# Patient Record
(demographics unavailable — no encounter records)

---

## 2024-10-29 NOTE — REASON FOR VISIT
[Symptom and Test Evaluation] : symptom and test evaluation [Arrhythmia/ECG Abnorrmalities] : arrhythmia/ECG abnormalities [Structural Heart and Valve Disease] : structural heart and valve disease [Hyperlipidemia] : hyperlipidemia

## 2024-10-31 NOTE — REVIEW OF SYSTEMS
[Anxiety] : anxiety [Fever] : no fever [Blurry Vision] : no blurred vision [Earache] : no earache [Sore Throat] : no sore throat [SOB] : no shortness of breath [Dyspnea on exertion] : not dyspnea during exertion [Chest Discomfort] : no chest discomfort [Cough] : no cough [Abdominal Pain] : no abdominal pain [Nausea] : no nausea [Vomiting] : no vomiting [Change in Appetite] : no change in appetite [Constipation] : no constipation [Dysuria] : no dysuria [Joint Pain] : no joint pain [Rash] : no rash [Numbness (Hypoesthesia)] : no numbness [Weakness] : no weakness [Speech Disturbance] : no speech disturbance [Memory Lapses Or Loss] : no memory lapses or loss [Depression] : no depression

## 2024-10-31 NOTE — CARDIOLOGY SUMMARY
[de-identified] : 10/31/24 sinus rhythm left axis LAFB LVH   no significant change.  [de-identified] : 9/29/23 predominantly sinus rhythm  ,rare PACS 22 runs of PSVT afib lasted for 3 hours 24 seconds between 72 to 225  total 3% of burden  rare PVCS  9/15/24 sinus rhythm  4 runs of PSVT longest 6 beats ,   rare PVCS , symptoms appears to be associated with  PVCS  [de-identified] : 12/6/21  5 METS  91%MPHR   rare VPCS during stress  normal nuclear stress study  normal EF  [de-identified] : 12/10/21  EF 65-70%  aortic sclerosis , Mitral valve sclerosis  Trace MR  8/2/24  Normal EF mild DD  MAC , aortic sclerosis , Mild TR normal RVSP  [de-identified] : 12/10/21  mild atherosclerosis no stenosis

## 2024-10-31 NOTE — CARDIOLOGY SUMMARY
[de-identified] : 10/31/24 sinus rhythm left axis LAFB LVH   no significant change.  [de-identified] : 9/29/23 predominantly sinus rhythm  ,rare PACS 22 runs of PSVT afib lasted for 3 hours 24 seconds between 72 to 225  total 3% of burden  rare PVCS  9/15/24 sinus rhythm  4 runs of PSVT longest 6 beats ,   rare PVCS , symptoms appears to be associated with  PVCS  [de-identified] : 12/6/21  5 METS  91%MPHR   rare VPCS during stress  normal nuclear stress study  normal EF  [de-identified] : 12/10/21  EF 65-70%  aortic sclerosis , Mitral valve sclerosis  Trace MR  8/2/24  Normal EF mild DD  MAC , aortic sclerosis , Mild TR normal RVSP  [de-identified] : 12/10/21  mild atherosclerosis no stenosis

## 2024-10-31 NOTE — DISCUSSION/SUMMARY
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  will continue atenolol ,   Hx prior atrial fibrillation episode in setting acute  GI illness with dehydration ::palpitations  with recurrent atrial fibrillation , with controlled rate , PAF   currently in sinus rhythm  had normal ventricular systolic function , normal LA size ,  continue atenolol ,  eliquis 5 mg po BID ,   Hyperlipidemia :  non adherent to medication , continue simvastatin to 80 mg po daily  without missing , repeat blood work if still elevated would add zetia 10 mg po daily  follow up blood work   abnormal MRI of brain : report reviewed  showed chronic microvascular white matter disease , no obvious evidence of stroke ( infarct on MRI )  normal blood pressure , would recommend to maintain LDL level < 70 , will titrate the medication as needed ,   tingling sensations in both feet : based on description appears to be peripheral neuropathy  recommended to follow up with neurologist for further work up   anxiety :  continue zoloft  , PRN alprazolam  psych follow up      [EKG obtained to assist in diagnosis and management of assessed problem(s)] : EKG obtained to assist in diagnosis and management of assessed problem(s)

## 2024-10-31 NOTE — HISTORY OF PRESENT ILLNESS
[FreeTextEntry1] : This is a 71  year old woman with a history of anxiety , PAF ,neuropathy , hyperlipidemia came for follow up says  she is feeling ok , less palpitations , but her right knee pain bothering her a lot , recommended to have knee replacement  . Patient denies any chest pain or shortness of breath or dizziness , occasional palpitation .   patient is very anxious ,  her ekg showed sinus bradycardia , blood pressure is normal range ,  Patient  had normal nuclear stress test in December 2021 ,  had echo showed normal ventricular function , aortic sclerosis .  normal  BP   Patient was having herniated disc ,  tingling sensation  both feet and lower part of legs , more in the supine position , not related to activity ,  patient was evaluated  by neurologist ,   Patient s lipid s   , ,  Oct 2024  Patient says she is not adherent to 80 mg po simvastatin ,   her blood pressure is optimal   She has been having jaw tingling and pain.  She is being worked up for trigeminal neuralgia, but was asked to exclude a cardiac etiology.  the MRI report reviewed, showed microvascular  disease , no specific infarct noted , She denies an exertional component.  No other cardiac symptoms.

## 2025-01-05 NOTE — DISCUSSION/SUMMARY
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  will continue atenolol ,   Hx prior atrial fibrillation episode in setting acute  GI illness with dehydration ::palpitations  with recurrent atrial fibrillation , with controlled rate , PAF   currently in sinus rhythm  had normal ventricular systolic function , normal LA size ,  continue atenolol ,  eliquis 5 mg po BID ,   Hyperlipidemia :  non adherent to medication , continue simvastatin to 80 mg po daily  without missing , repeat blood work if still elevated would add zetia 10 mg po daily  follow up blood work   abnormal MRI of brain : report reviewed  showed chronic microvascular white matter disease , no obvious evidence of stroke ( infarct on MRI )  normal blood pressure , would recommend to maintain LDL level < 70 , will titrate the medication as needed ,   tingling sensations in both feet : based on description appears to be peripheral neuropathy  recommended to follow up with neurologist for further work up   anxiety :  continue zoloft  , PRN alprazolam  psych follow up

## 2025-01-05 NOTE — CARDIOLOGY SUMMARY
[de-identified] : 10/31/24 sinus rhythm left axis LAFB LVH   no significant change.  [de-identified] : 9/29/23 predominantly sinus rhythm  ,rare PACS 22 runs of PSVT afib lasted for 3 hours 24 seconds between 72 to 225  total 3% of burden  rare PVCS  9/15/24 sinus rhythm  4 runs of PSVT longest 6 beats ,   rare PVCS , symptoms appears to be associated with  PVCS  [de-identified] : 12/6/21  5 METS  91%MPHR   rare VPCS during stress  normal nuclear stress study  normal EF  [de-identified] : 12/10/21  EF 65-70%  aortic sclerosis , Mitral valve sclerosis  Trace MR  8/2/24  Normal EF mild DD  MAC , aortic sclerosis , Mild TR normal RVSP  [de-identified] : 12/10/21  mild atherosclerosis no stenosis

## 2025-01-05 NOTE — REVIEW OF SYSTEMS
[Fever] : no fever [Blurry Vision] : no blurred vision [Earache] : no earache [Sore Throat] : no sore throat [SOB] : no shortness of breath [Dyspnea on exertion] : not dyspnea during exertion [Chest Discomfort] : no chest discomfort [Cough] : no cough [Abdominal Pain] : no abdominal pain [Nausea] : no nausea [Vomiting] : no vomiting [Change in Appetite] : no change in appetite [Constipation] : no constipation [Dysuria] : no dysuria [Joint Pain] : no joint pain [Rash] : no rash [Numbness (Hypoesthesia)] : no numbness [Weakness] : no weakness [Speech Disturbance] : no speech disturbance [Memory Lapses Or Loss] : no memory lapses or loss [Depression] : no depression [Anxiety] : anxiety

## 2025-01-05 NOTE — PHYSICAL EXAM
[Well Developed] : well developed [Well Nourished] : well nourished [No Acute Distress] : no acute distress [Normal Conjunctiva] : normal conjunctiva [Normal Venous Pressure] : normal venous pressure [No Carotid Bruit] : no carotid bruit [Normal S1, S2] : normal S1, S2 [No Murmur] : no murmur [No Rub] : no rub [No Gallop] : no gallop [Clear Lung Fields] : clear lung fields [Good Air Entry] : good air entry [No Respiratory Distress] : no respiratory distress  [Non Tender] : non-tender [Soft] : abdomen soft [Normal Bowel Sounds] : normal bowel sounds [Normal Gait] : normal gait [No Edema] : no edema [No Cyanosis] : no cyanosis [No Clubbing] : no clubbing [No Varicosities] : no varicosities [No Rash] : no rash [No Skin Lesions] : no skin lesions [Moves all extremities] : moves all extremities [No Focal Deficits] : no focal deficits [Alert and Oriented] : alert and oriented [Normal Speech] : normal speech [Normal memory] : normal memory

## 2025-01-12 NOTE — CARDIOLOGY SUMMARY
[de-identified] : 10/31/24 sinus rhythm left axis LAFB LVH   no significant change.  [de-identified] : 9/29/23 predominantly sinus rhythm  ,rare PACS 22 runs of PSVT afib lasted for 3 hours 24 seconds between 72 to 225  total 3% of burden  rare PVCS  9/15/24 sinus rhythm  4 runs of PSVT longest 6 beats ,   rare PVCS , symptoms appears to be associated with  PVCS  [de-identified] : 12/6/21  5 METS  91%MPHR   rare VPCS during stress  normal nuclear stress study  normal EF  [de-identified] : 12/10/21  EF 65-70%  aortic sclerosis , Mitral valve sclerosis  Trace MR  8/2/24  Normal EF mild DD  MAC , aortic sclerosis , Mild TR normal RVSP  [de-identified] : 12/10/21  mild atherosclerosis no stenosis

## 2025-01-12 NOTE — REVIEW OF SYSTEMS
[Fever] : no fever [Blurry Vision] : no blurred vision [Earache] : no earache [Sore Throat] : no sore throat [SOB] : no shortness of breath [Dyspnea on exertion] : not dyspnea during exertion [Chest Discomfort] : no chest discomfort [Cough] : no cough [Abdominal Pain] : no abdominal pain [Nausea] : no nausea [Vomiting] : no vomiting [Change in Appetite] : no change in appetite [Constipation] : no constipation [Dysuria] : no dysuria [Joint Pain] : no joint pain [Rash] : no rash [Numbness (Hypoesthesia)] : no numbness [Weakness] : no weakness [Speech Disturbance] : no speech disturbance [Memory Lapses Or Loss] : no memory lapses or loss [Depression] : no depression

## 2025-01-17 NOTE — CARDIOLOGY SUMMARY
[de-identified] : 10/31/24 sinus rhythm left axis LAFB LVH   no significant change.  [de-identified] : 9/29/23 predominantly sinus rhythm  ,rare PACS 22 runs of PSVT afib lasted for 3 hours 24 seconds between 72 to 225  total 3% of burden  rare PVCS  9/15/24 sinus rhythm  4 runs of PSVT longest 6 beats ,   rare PVCS , symptoms appears to be associated with  PVCS  [de-identified] : 12/6/21  5 METS  91%MPHR   rare VPCS during stress  normal nuclear stress study  normal EF  [de-identified] : 12/10/21  EF 65-70%  aortic sclerosis , Mitral valve sclerosis  Trace MR  8/2/24  Normal EF mild DD  MAC , aortic sclerosis , Mild TR normal RVSP  [de-identified] : 12/10/21  mild atherosclerosis no stenosis

## 2025-01-21 NOTE — CARDIOLOGY SUMMARY
[de-identified] : 10/31/24 sinus rhythm left axis LAFB LVH   no significant change.  [de-identified] : 9/29/23 predominantly sinus rhythm  ,rare PACS 22 runs of PSVT afib lasted for 3 hours 24 seconds between 72 to 225  total 3% of burden  rare PVCS  9/15/24 sinus rhythm  4 runs of PSVT longest 6 beats ,   rare PVCS , symptoms appears to be associated with  PVCS  [de-identified] : 12/6/21  5 METS  91%MPHR   rare VPCS during stress  normal nuclear stress study  normal EF  [de-identified] : 12/10/21  EF 65-70%  aortic sclerosis , Mitral valve sclerosis  Trace MR  8/2/24  Normal EF mild DD  MAC , aortic sclerosis , Mild TR normal RVSP  [de-identified] : 12/10/21  mild atherosclerosis no stenosis

## 2025-02-07 NOTE — IMAGING
[Bilateral] : knee bilaterally [All Views] : anteroposterior, lateral, skyline, and anteroposterior standing [Advanced patellofemoral OA] : Advanced patellofemoral OA

## 2025-02-07 NOTE — ASSESSMENT
[FreeTextEntry1] : 72 year F WITH MODERATE B/L KNEE PAIN, R>L. PAIN WORSENS WITH STAIRS AND WALKING PROLONGED DISTANCES. PAIN IS AFFECTING ADL AND FUNCTIONAL ACTIVITIES. XRAYS REVIEWED WITH B/L ADVANCED PATELLOFEMORAL OA. TREATMENT OPTIONS REVIEWED. QUESTIONS ANSWERED.   DISCUSSED TAKING ANTI-INFLAMMATORIES AND MEDICATION MANAGEMENT. BECAUSE THE PATIENT IS ON A BLOOD THINNER, I RECOMMENDED NOT TAKING ANTI-INFLAMMATORIES BUT WILL CONTINUE WITH TYLENOL AS NEEDED FOR PAIN.

## 2025-02-07 NOTE — PHYSICAL EXAM
[Bilateral] : knee bilaterally [] : patient ambulates without assistive device [TWNoteComboBox7] : flexion 130 degrees [de-identified] : extension 0 degrees

## 2025-02-07 NOTE — DISCUSSION/SUMMARY
[de-identified] : I, Mariah Fajardo, am scribing for Dr. Sanders in his presence for the chief complaint, physical exam, studies, assessment, and/or plan.

## 2025-02-27 NOTE — DISCUSSION/SUMMARY
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  will continue atenolol ,   Hx prior atrial fibrillation episode in setting acute  GI illness with dehydration ::palpitations  with recurrent atrial fibrillation , with controlled rate , PAF   currently in sinus rhythm  had normal ventricular systolic function , normal LA size ,  continue atenolol ,  eliquis 5 mg po BID ,   Hyperlipidemia :  non adherent to medication , continue simvastatin to 80 mg po daily  without missing , repeat blood work if still elevated would add zetia 10 mg po daily  follow up blood work   abnormal MRI of brain : report reviewed  showed chronic microvascular white matter disease , no obvious evidence of stroke ( infarct on MRI )  normal blood pressure , would recommend to maintain LDL level < 70 , will titrate the medication as needed ,   tingling sensations in both feet : based on description appears to be peripheral neuropathy  recommended to follow up with neurologist for further work up   anxiety :  continue zoloft  , PRN alprazolam  psych follow up

## 2025-02-27 NOTE — HISTORY OF PRESENT ILLNESS
[FreeTextEntry1] : This is a 71  year old woman with a history of anxiety , PAF ,neuropathy , hyperlipidemia came for follow up says  she is feeling terrible as she lost her PET recently very distrubed with it mentally , but , less palpitations than before , but her right knee pain bothering her a lot , recommended to have knee replacement  . Patient denies any chest pain or shortness of breath or dizziness , occasional palpitation .   patient is very anxious ,  her ekg showed sinus bradycardia , blood pressure is normal range ,  Patient  had normal nuclear stress test in December 2021 ,  had echo showed normal ventricular function , aortic sclerosis .  normal  BP   Patient was having herniated disc ,  tingling sensation  both feet and lower part of legs , more in the supine position , not related to activity ,  patient was evaluated  by neurologist ,   Patient s lipid s   , ,  Oct 2024  Patient says she is not adherent to 80 mg po simvastatin ,   her blood pressure is optimal   She has been having jaw tingling and pain.  She is being worked up for trigeminal neuralgia, but was asked to exclude a cardiac etiology.  the MRI report reviewed, showed microvascular  disease , no specific infarct noted , She denies an exertional component.  No other cardiac symptoms.

## 2025-02-27 NOTE — CARDIOLOGY SUMMARY
[de-identified] : 2/27/25 sinus rhythm left axis LAFB LVH   no significant change.  [de-identified] : 9/29/23 predominantly sinus rhythm  ,rare PACS 22 runs of PSVT afib lasted for 3 hours 24 seconds between 72 to 225  total 3% of burden  rare PVCS  9/15/24 sinus rhythm  4 runs of PSVT longest 6 beats ,   rare PVCS , symptoms appears to be associated with  PVCS  [de-identified] : 12/6/21  5 METS  91%MPHR   rare VPCS during stress  normal nuclear stress study  normal EF  [de-identified] : 12/10/21  EF 65-70%  aortic sclerosis , Mitral valve sclerosis  Trace MR  8/2/24  Normal EF mild DD  MAC , aortic sclerosis , Mild TR normal RVSP  [de-identified] : 12/10/21  mild atherosclerosis no stenosis

## 2025-02-27 NOTE — CARDIOLOGY SUMMARY
[de-identified] : 2/27/25 sinus rhythm left axis LAFB LVH   no significant change.  [de-identified] : 9/29/23 predominantly sinus rhythm  ,rare PACS 22 runs of PSVT afib lasted for 3 hours 24 seconds between 72 to 225  total 3% of burden  rare PVCS  9/15/24 sinus rhythm  4 runs of PSVT longest 6 beats ,   rare PVCS , symptoms appears to be associated with  PVCS  [de-identified] : 12/6/21  5 METS  91%MPHR   rare VPCS during stress  normal nuclear stress study  normal EF  [de-identified] : 12/10/21  EF 65-70%  aortic sclerosis , Mitral valve sclerosis  Trace MR  8/2/24  Normal EF mild DD  MAC , aortic sclerosis , Mild TR normal RVSP  [de-identified] : 12/10/21  mild atherosclerosis no stenosis

## 2025-02-27 NOTE — CARDIOLOGY SUMMARY
[de-identified] : 2/27/25 sinus rhythm left axis LAFB LVH   no significant change.  [de-identified] : 9/29/23 predominantly sinus rhythm  ,rare PACS 22 runs of PSVT afib lasted for 3 hours 24 seconds between 72 to 225  total 3% of burden  rare PVCS  9/15/24 sinus rhythm  4 runs of PSVT longest 6 beats ,   rare PVCS , symptoms appears to be associated with  PVCS  [de-identified] : 12/6/21  5 METS  91%MPHR   rare VPCS during stress  normal nuclear stress study  normal EF  [de-identified] : 12/10/21  EF 65-70%  aortic sclerosis , Mitral valve sclerosis  Trace MR  8/2/24  Normal EF mild DD  MAC , aortic sclerosis , Mild TR normal RVSP  [de-identified] : 12/10/21  mild atherosclerosis no stenosis

## 2025-03-11 NOTE — HISTORY OF PRESENT ILLNESS
[de-identified] : Dang Del Real is a 73 year old female with history of afib on Eliquis presents for evaluation of sore throat. She states she has had sore throat for the past month. She was treated with zpak for pharyngitis at urgent care with negative strep test a month ago. She did not have any relief with zpak. She saw her PCP 4 days ago and was given clotrimazole for thrush. She states she has burning sensation in mouth. She states she has odynophagia. She states intermittent hoarseness of voice. She is unsure if she has heartburn symptoms. She denies dysphagia, dyspnea, choking, regurgitation. She denies recent fever or recurrent tonsillitis.

## 2025-03-11 NOTE — PHYSICAL EXAM
[Midline] : trachea located in midline position [Laryngoscopy Performed] : laryngoscopy was performed, see procedure section for findings [Normal] :  tongue is normal

## 2025-03-11 NOTE — ASSESSMENT
[FreeTextEntry1] : Dang Del Real presents for evaluation of chronic sore throat. She is on troches for thrush as per pcp. On exam, there is no evidence of thrush. Flexible laryngoscopy was performed showing evidence of laryngopharyngeal reflux. We discussed antireflux precautions and handout was provided.  - f/u in 3 mo

## 2025-04-11 NOTE — PHYSICAL EXAM
[Bilateral] : knee bilaterally [] : no erythema [TWNoteComboBox7] : flexion 130 degrees [de-identified] : extension 0 degrees

## 2025-04-11 NOTE — ASSESSMENT
[FreeTextEntry1] : 72 year F WITH MODERATE B/L KNEE PAIN, R>L. PAIN WORSENS WITH STAIRS AND WALKING PROLONGED DISTANCES. PAIN IS AFFECTING ADL AND FUNCTIONAL ACTIVITIES. XRAYS REVIEWED WITH B/L ADVANCED PATELLOFEMORAL OA. TREATMENT OPTIONS REVIEWED. QUESTIONS ANSWERED. PT RX.  DISCUSSED TAKING ANTI-INFLAMMATORIES AND MEDICATION MANAGEMENT. BECAUSE THE PATIENT IS ON A BLOOD THINNER, I RECOMMENDED NOT TAKING ANTI-INFLAMMATORIES BUT WILL CONTINUE WITH TYLENOL AS NEEDED FOR PAIN. LIDOCAINE PATCHES PRESCRIBED.  Therapy

## 2025-04-11 NOTE — HISTORY OF PRESENT ILLNESS
[Intermittent] : intermittent [Rest] : rest [Walking] : walking [5] : 5 [de-identified] : 02/07/2025: Dang is a 72-year-old female presenting with right knee pain. Patient states she fell in May while walking on the concrete.   4/11/25: Patient presents today for a follow up visit for the right knee. Patient states both hips bother her.  [FreeTextEntry1] : Right knee [FreeTextEntry9] : elevation

## 2025-05-02 NOTE — ASSESSMENT
[FreeTextEntry1] : Dang Del Real presents for follow-up. She implemented antireflux precautions and notes resolution of sore throat. She is pleased with outcome.  - f/u prn

## 2025-05-02 NOTE — HISTORY OF PRESENT ILLNESS
[de-identified] :  Dang Del Real is a 73 year old female with history of afib on Eliquis presents for evaluation of sore throat. She states she has had sore throat for the past month. She was treated with zpak for pharyngitis at urgent care with negative strep test a month ago. She did not have any relief with zpak. She saw her PCP 4 days ago and was given clotrimazole for thrush. She states she has burning sensation in mouth. She states she has odynophagia. She states intermittent hoarseness of voice. She is unsure if she has heartburn symptoms. She denies dysphagia, dyspnea, choking, regurgitation. She denies recent fever or recurrent tonsillitis.  [FreeTextEntry1] : 5/2/25 - Dangbright Del Real presents for follow up. She states her sore throat has improved after implementing antireflux precautions. She denies dysphagia, dysphonia, globus sensation, aspiration, dyspnea. She denies heartburn symptoms. She denies fever.

## 2025-06-19 NOTE — CARDIOLOGY SUMMARY
[de-identified] : 2/27/25 sinus rhythm left axis LAFB LVH   no significant change.  [de-identified] : 9/29/23 predominantly sinus rhythm  ,rare PACS 22 runs of PSVT afib lasted for 3 hours 24 seconds between 72 to 225  total 3% of burden  rare PVCS  9/15/24 sinus rhythm  4 runs of PSVT longest 6 beats ,   rare PVCS , symptoms appears to be associated with  PVCS  [de-identified] : 12/6/21  5 METS  91%MPHR   rare VPCS during stress  normal nuclear stress study  normal EF  6/19/25   normal chemical nuclear stress test  [de-identified] : 12/10/21  EF 65-70%  aortic sclerosis , Mitral valve sclerosis  Trace MR  8/2/24  Normal EF mild DD  MAC , aortic sclerosis , Mild TR normal RVSP  [de-identified] : 12/10/21  mild atherosclerosis no stenosis

## 2025-06-19 NOTE — DISCUSSION/SUMMARY
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  will continue atenolol  25 mg po daily  ,   Hx prior atrial fibrillation episode in setting acute  GI illness with dehydration ::palpitations  with recurrent atrial fibrillation , with controlled rate , PAF   currently in sinus rhythm  had normal ventricular systolic function , normal LA size ,  continue atenolol ,  eliquis 5 mg po BID ,   Hyperlipidemia:   improving non adherent to medication , continue simvastatin to 80 mg po daily   zetia 10 mg po daily  follow up blood work   abnormal MRI of brain : report reviewed  showed chronic microvascular white matter disease , no obvious evidence of stroke ( infarct on MRI )  normal blood pressure , would recommend to maintain LDL level < 70 , will titrate the medication as needed ,   tingling sensations in both feet : based on description appears to be peripheral neuropathy  recommended to follow up with neurologist for further work up   anxiety :  continue zoloft  , PRN alprazolam  psych follow up   patient scheduled to have preop blood work and ekg

## 2025-06-19 NOTE — HISTORY OF PRESENT ILLNESS
Staff nurse reports patient is c/o dysuria this AM. Hx of frequent UTIs. Ordered UA / C&S today; OK to straight cath to obtain sample. Sample obtained was Hazy cloudy concentrated urine. UA results received. Revealed cloudy urine; trace leukocyte esterase; neg nitrite; 3+ blood; >75 RBC; >3-5 WBC; Few bacteria. Ordered: Cipro 500 mg tab po every 12 hrs x 7 days and HOLD Eliquis x 3 days.   
[FreeTextEntry1] : This is a 71  year old woman with a history of anxiety , PAF ,neuropathy , hyperlipidemia came for pre op cardiac evaluation for right knee replacement  . Patient denies any chest pain or shortness of breath or dizziness , occasional palpitation .   patient is very anxious ,  her ekg showed sinus bradycardia , blood pressure is normal range ,  Patient  had normal nuclear stress test done today  ,  had echo showed normal ventricular function , aortic sclerosis .  normal  BP   Patient was having herniated disc ,  tingling sensation  both feet and lower part of legs , more in the supine position , not related to activity ,  patient was evaluated  by neurologist ,   Patient s lipid s  TC  196 , , TG  233  June 2025  Patient says she is not adherent to 80 mg po simvastatin ,   Hba1c 5.7 her blood pressure is optimal   She has been having jaw tingling and pain.  She is being worked up for trigeminal neuralgia, but was asked to exclude a cardiac etiology.  the MRI report reviewed, showed microvascular  disease , no specific infarct noted , She denies an exertional component.  No other cardiac symptoms.    
[FreeTextEntry1] : This is a 71  year old woman with a history of anxiety , PAF ,neuropathy , hyperlipidemia came for pre op cardiac evaluation for right knee replacement  . Patient denies any chest pain or shortness of breath or dizziness , occasional palpitation .   patient is very anxious ,  her ekg showed sinus bradycardia , blood pressure is normal range ,  Patient  had normal nuclear stress test done today  ,  had echo showed normal ventricular function , aortic sclerosis .  normal  BP   Patient was having herniated disc ,  tingling sensation  both feet and lower part of legs , more in the supine position , not related to activity ,  patient was evaluated  by neurologist ,   Patient s lipid s  TC  196 , , TG  233  June 2025  Patient says she is not adherent to 80 mg po simvastatin ,   Hba1c 5.7 her blood pressure is optimal   She has been having jaw tingling and pain.  She is being worked up for trigeminal neuralgia, but was asked to exclude a cardiac etiology.  the MRI report reviewed, showed microvascular  disease , no specific infarct noted , She denies an exertional component.  No other cardiac symptoms.

## 2025-06-19 NOTE — CARDIOLOGY SUMMARY
[de-identified] : 2/27/25 sinus rhythm left axis LAFB LVH   no significant change.  [de-identified] : 9/29/23 predominantly sinus rhythm  ,rare PACS 22 runs of PSVT afib lasted for 3 hours 24 seconds between 72 to 225  total 3% of burden  rare PVCS  9/15/24 sinus rhythm  4 runs of PSVT longest 6 beats ,   rare PVCS , symptoms appears to be associated with  PVCS  [de-identified] : 12/6/21  5 METS  91%MPHR   rare VPCS during stress  normal nuclear stress study  normal EF  6/19/25   normal chemical nuclear stress test  [de-identified] : 12/10/21  EF 65-70%  aortic sclerosis , Mitral valve sclerosis  Trace MR  8/2/24  Normal EF mild DD  MAC , aortic sclerosis , Mild TR normal RVSP  [de-identified] : 12/10/21  mild atherosclerosis no stenosis

## 2025-07-25 NOTE — HISTORY OF PRESENT ILLNESS
[FreeTextEntry1] : This is a 71  year old woman with a history of anxiety , PAF ,neuropathy , hyperlipidemia came for follow up  after right knee replacement on july 14 th , was discharged after 2 days , she under home care , her blood pressure was running low normal range  in BP 90s , patient denies any dizziness , says she drinking enough  water  ,sits most of the day , had mild LE swelling , did have LE doppler without evidence of DVT ,   Patient denies any chest pain or shortness of breath or dizziness , occasional palpitation .   patient is very anxious ,  her ekg showed sinus bradycardia , blood pressure is normal range ,  Patient  had normal nuclear stress test done today  ,  had echo showed normal ventricular function , aortic sclerosis .  normal  BP   Patient was having herniated disc ,  tingling sensation  both feet and lower part of legs , more in the supine position , not related to activity ,  patient was evaluated  by neurologist ,   Patient s lipid s  TC  196 , , TG  233  June 2025  Patient says she is not adherent to 80 mg po simvastatin ,   Hba1c 5.7 her blood pressure is optimal   She has been having jaw tingling and pain.  She is being worked up for trigeminal neuralgia, but was asked to exclude a cardiac etiology.  the MRI report reviewed, showed microvascular  disease , no specific infarct noted , She denies an exertional component.  No other cardiac symptoms.

## 2025-07-25 NOTE — DISCUSSION/SUMMARY
[EKG obtained to assist in diagnosis and management of assessed problem(s)] : EKG obtained to assist in diagnosis and management of assessed problem(s) [FreeTextEntry1] : Patient with above hx  Patient with  HTN PAF HLD PVCS  normal EF ,who is post op  , 7/14/25  low normal range sbp ,  ? lightheaded ness ,  patient usually runs low normal range , advised to maintain water intake ,  cut down atenolol 12.5 mg po daily , will obtain CBC CMP   Palpitations : PAF  in sinus rhythm in exam , patient extreme anxiety could be reason for her symptoms , likely symptomatic PVCS  will decrease  atenolol  12.5 mg po daily  ,   Hx prior atrial fibrillation episode in setting acute  GI illness with dehydration ::palpitations  with recurrent atrial fibrillation , with controlled rate , PAF   currently in sinus rhythm  had normal ventricular systolic function , normal LA size ,  continue atenolol ,  eliquis 5 mg po BID ,   Hyperlipidemia:   improving non adherent to medication , continue simvastatin to 80 mg po daily   zetia 10 mg po daily  follow up blood work   abnormal MRI of brain : report reviewed  showed chronic microvascular white matter disease , no obvious evidence of stroke ( infarct on MRI )  normal blood pressure , would recommend to maintain LDL level < 70 , will titrate the medication as needed ,   tingling sensations in both feet : based on description appears to be peripheral neuropathy  recommended to follow up with neurologist for further work up   anxiety :  continue zoloft  , PRN alprazolam  psych follow up

## 2025-07-25 NOTE — CARDIOLOGY SUMMARY
[de-identified] : 7/25/25  sinus rhythm left axis LAFB LVH   no significant change.  [de-identified] : 9/29/23 predominantly sinus rhythm  ,rare PACS 22 runs of PSVT afib lasted for 3 hours 24 seconds between 72 to 225  total 3% of burden  rare PVCS  9/15/24 sinus rhythm  4 runs of PSVT longest 6 beats ,   rare PVCS , symptoms appears to be associated with  PVCS  [de-identified] : 12/6/21  5 METS  91%MPHR   rare VPCS during stress  normal nuclear stress study  normal EF  6/19/25   normal chemical nuclear stress test  [de-identified] : 12/10/21  EF 65-70%  aortic sclerosis , Mitral valve sclerosis  Trace MR  8/2/24  Normal EF mild DD  MAC , aortic sclerosis , Mild TR normal RVSP  [de-identified] : 12/10/21  mild atherosclerosis no stenosis

## 2025-07-25 NOTE — CARDIOLOGY SUMMARY
[de-identified] : 7/25/25  sinus rhythm left axis LAFB LVH   no significant change.  [de-identified] : 9/29/23 predominantly sinus rhythm  ,rare PACS 22 runs of PSVT afib lasted for 3 hours 24 seconds between 72 to 225  total 3% of burden  rare PVCS  9/15/24 sinus rhythm  4 runs of PSVT longest 6 beats ,   rare PVCS , symptoms appears to be associated with  PVCS  [de-identified] : 12/6/21  5 METS  91%MPHR   rare VPCS during stress  normal nuclear stress study  normal EF  6/19/25   normal chemical nuclear stress test  [de-identified] : 12/10/21  EF 65-70%  aortic sclerosis , Mitral valve sclerosis  Trace MR  8/2/24  Normal EF mild DD  MAC , aortic sclerosis , Mild TR normal RVSP  [de-identified] : 12/10/21  mild atherosclerosis no stenosis